# Patient Record
Sex: FEMALE | Race: WHITE | URBAN - METROPOLITAN AREA
[De-identification: names, ages, dates, MRNs, and addresses within clinical notes are randomized per-mention and may not be internally consistent; named-entity substitution may affect disease eponyms.]

---

## 2017-03-23 ENCOUNTER — PRE VISIT (OUTPATIENT)
Dept: OTOLARYNGOLOGY | Facility: CLINIC | Age: 62
End: 2017-03-23

## 2017-03-23 NOTE — TELEPHONE ENCOUNTER
1.  Date/reason for appt:4/4/17, Subglottic stenosis  2.  Referring provider: REED ARTHUR  3.  Call to patient (Yes / No - short description):No, referred  4.  Previous care at / records requested from:   [facility]- faxed cover sheet requesting records.

## 2017-03-23 NOTE — TELEPHONE ENCOUNTER
Radiology reports received from [facility]. Notified the film room to pull images.  CT head on 4/19/16  CT angio head on 4/19/16

## 2017-03-23 NOTE — TELEPHONE ENCOUNTER
Records received from [facility].   Included  Office notes: 10/19/16   SLP notes on 5/9/16, 5/10/16  Op/Procedure notes: bronchoscopy on 2/6/17  Other: PFT on 2/2/17

## 2017-04-04 ENCOUNTER — OFFICE VISIT (OUTPATIENT)
Dept: OTOLARYNGOLOGY | Facility: CLINIC | Age: 62
End: 2017-04-04

## 2017-04-04 VITALS — BODY MASS INDEX: 41.11 KG/M2 | WEIGHT: 232 LBS | HEIGHT: 63 IN

## 2017-04-04 DIAGNOSIS — R49.0 DYSPHONIA: Primary | ICD-10-CM

## 2017-04-04 DIAGNOSIS — J39.8 TRACHEAL STENOSIS: ICD-10-CM

## 2017-04-04 RX ORDER — MECLIZINE HYDROCHLORIDE 25 MG/1
25 TABLET ORAL
COMMUNITY
Start: 2017-03-09

## 2017-04-04 RX ORDER — ONDANSETRON 4 MG/1
4 TABLET, ORALLY DISINTEGRATING ORAL
COMMUNITY
Start: 2017-03-09

## 2017-04-04 RX ORDER — SUMATRIPTAN 50 MG/1
TABLET, FILM COATED ORAL
COMMUNITY
Start: 2016-08-03

## 2017-04-04 RX ORDER — HYDROMORPHONE HYDROCHLORIDE 2 MG/1
2-4 TABLET ORAL
COMMUNITY
Start: 2016-11-30

## 2017-04-04 RX ORDER — AMLODIPINE BESYLATE 5 MG/1
TABLET ORAL
COMMUNITY
Start: 2016-10-31

## 2017-04-04 RX ORDER — OXYCODONE HYDROCHLORIDE 5 MG/1
TABLET ORAL
COMMUNITY
Start: 2017-03-08

## 2017-04-04 RX ORDER — CITALOPRAM HYDROBROMIDE 20 MG/1
TABLET ORAL
COMMUNITY
Start: 2016-12-14

## 2017-04-04 RX ORDER — METOPROLOL TARTRATE 50 MG
50 TABLET ORAL
COMMUNITY
Start: 2016-04-21

## 2017-04-04 RX ORDER — POLYETHYLENE GLYCOL 3350 17 G/17G
17 POWDER, FOR SOLUTION ORAL
COMMUNITY
Start: 2016-08-03

## 2017-04-04 RX ORDER — SENNOSIDES A AND B 8.6 MG/1
TABLET, FILM COATED ORAL
COMMUNITY
Start: 2016-10-26

## 2017-04-04 RX ORDER — SUMATRIPTAN 100 MG/1
100 TABLET, FILM COATED ORAL
COMMUNITY
Start: 2017-03-09

## 2017-04-04 RX ORDER — LOSARTAN POTASSIUM 100 MG/1
100 TABLET ORAL
COMMUNITY
Start: 2017-01-24

## 2017-04-04 RX ORDER — IBUPROFEN 800 MG/1
TABLET, FILM COATED ORAL
COMMUNITY
Start: 2016-12-12

## 2017-04-04 RX ORDER — METHOCARBAMOL 750 MG/1
TABLET, FILM COATED ORAL
COMMUNITY
Start: 2017-01-11

## 2017-04-04 RX ORDER — GABAPENTIN 300 MG/1
CAPSULE ORAL
COMMUNITY
Start: 2017-01-12

## 2017-04-04 ASSESSMENT — PAIN SCALES - GENERAL: PAINLEVEL: NO PAIN (0)

## 2017-04-04 NOTE — MR AVS SNAPSHOT
After Visit Summary   2017    Jacqui Rodriguez    MRN: [id number]           Patient Information     Date Of Birth          1955        Visit Information        Provider Department      2017 9:00 AM Lit Bridges MD [facility]        Today's Diagnoses     Dysphonia    -  1    Tracheal stenosis          Care Instructions    Plan of care:  Follow up with Dr Bridges as needed  Clinic contact information:  1. To schedule an appointment call [phone], option 1  2. To talk to the Triage RN call [phone], option 3  3. If you need to speak to Whit or get a message to your doctor on a Friday, call the triage RN  4. WhitRN: [phone]  5. Surgery scheduling:      Jennifer Oswald: [phone]      Cinthya Irizaryr: [phone]  6. Fax: [phone]  7. [phone]          Follow-ups after your visit        Follow-up notes from your care team     Return if symptoms worsen or fail to improve.      Who to contact     Please call your clinic at [phone] to:    Ask questions about your health    Make or cancel appointments    Discuss your medicines    Learn about your test results    Speak to your doctor   If you have compliments or concerns about an experience at your clinic, or if you wish to file a complaint, please contact [facility] Patient Relations at [phone] or email us at [email or web address]         Additional Information About Your Visit        MyChart Information     Xambala is an electronic gateway that provides easy, online access to your medical records. With Xambala, you can request a clinic appointment, read your test results, renew a prescription or communicate with your care team.     To sign up for Xambala visit the website at [email or web address]   You will be asked to enter the access code listed below, as well as some personal information. Please follow the directions to create your  "username and password.     Your access code is: [email or web address]  Expires: 2017  6:30 AM     Your access code will  in 90 days. If you need help or a new code, please contact your [facility] or call [phone] for assistance.        Care EveryWhere ID     This is your Care EveryWhere ID. This could be used by other organizations to access your [facility] medical records  [id number]        Your Vitals Were     Height BMI (Body Mass Index)                1.6 m (5' 3\") 41.1 kg/m2           Blood Pressure from Last 3 Encounters:   No data found for BP    Weight from Last 3 Encounters:   17 105.2 kg (232 lb)              We Performed the Following     IMAGESTREAM RECORDING ORDER     LARYNGOSCOPY FLEX FIBEROPTIC, DIAGNOSTIC        Primary Care Provider    None Specified       No primary provider on file.        Thank you!     Thank you for choosing [facility] EAR [facility]SE AND THROAT  for your care. Our goal is always to provide you with excellent care. Hearing back from our patients is one way we can continue to improve our services. Please take a few minutes to complete the written survey that you may receive in the mail after your visit with us. Thank you!             Your Updated Medication List - Protect others around you: Learn how to safely use, store and throw away your medicines at www.disposemymeds.org.          This list is accurate as of: 17 11:59 PM.  Always use your most recent med list.                   Brand Name Dispense Instructions for use    amLODIPine 5 MG tablet    NORVASC     TAKE 1 TABLET BY MOUTH DAILY       aspirin 81 MG EC tablet      Take 81 mg by mouth       calcium carbonate 1250 (500 CA) MG/5ML Susp suspension      1,250 mg by Nasogastric route       cholecalciferol 1000 UNIT tablet    vitamin D     Take 1,000 Units by mouth       citalopram 20 MG tablet    celeXA     TAKE 1 TABLET BY MOUTH EVERY DAY       diclofenac 1 % Gel topical gel    VOLTAREN "     APPLY 2 GRAMS EXTERNALLY TO THE AFFECTED AREA FOUR TIMES DAILY       gabapentin 300 MG capsule    NEURONTIN         HYDROmorphone 2 MG tablet    DILAUDID     Take 2-4 mg by mouth       ibuprofen 800 MG tablet    ADVIL/MOTRIN     TAKE 1 TABLET BY MOUTH THREE TIMES DAILY       losartan 100 MG tablet    COZAAR     Take 100 mg by mouth       meclizine 25 MG tablet    ANTIVERT     Take 25 mg by mouth       methocarbamol 750 MG tablet    ROBAXIN     TAKE 2 TABLETS BY MOUTH THREE TIMES DAILY       metoprolol 50 MG tablet    LOPRESSOR     Take 50 mg by mouth       ondansetron 4 MG ODT tab    ZOFRAN-ODT     Take 4 mg by mouth       oxyCODONE 5 MG IR tablet    ROXICODONE         polyethylene glycol powder    MIRALAX/GLYCOLAX     Take 17 g by mouth       senna 8.6 MG tablet    SENOKOT         * SUMAtriptan 50 MG tablet    IMITREX     Reported on 3/9/2017       * SUMAtriptan 100 MG tablet    IMITREX     Take 100 mg by mouth       ZANTAC 150 MG tablet   Generic drug:  ranitidine          * Notice:  This list has 2 medication(s) that are the same as other medications prescribed for you. Read the directions carefully, and ask your doctor or other care provider to review them with you.

## 2017-04-04 NOTE — NURSING NOTE
Chief Complaint   Patient presents with     Consult     throat     Veronika Castillo Medical Assistant

## 2017-04-04 NOTE — LETTER
4/4/2017       RE: Jacqui Rodriguez  [address] [address]     Dear Colleague,    Thank you for referring your patient, Jacqui Rodriguez, to the [facility] E[facility] NO[facility] THROAT at [facility]. Please see a copy of my visit note below.      HISTORY OF PRESENT ILLNESS:  Ms. Rodriguez is a 61-year-old lady who is seen at the request of Dr. Divya Presley.  She has a complex medical history involving a cardiac arrest in 2005 with prolonged mechanical ventilation and subsequent tracheotomy.  She was eventually decannulated.  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  She had a prolonged recovery and did suffer anoxic encephalopathy.  She recently had an anterior cervical neck surgery for chronic neck pain.  This course again was complicated requiring tracheotomy.  She had apparently an anterior cervical fusion of C3-C7, developed stridor post intubation and required an emergency tracheotomy on March 24, 2016.  She was eventually decannulated in August of 2016.  She was seen by an otolaryngologist and the vocal folds appeared intact; however, there is some slight weakness of the right vocal fold.  She is very inactive because of knee and chronic back pain.  She lives with her sister.  She does have sleep apnea and uses her CPAP every night and occasionally will use it during the day.  She did have a bronchoscopy by Dr. Presley on February 6, 2017.  This found a subglottic stenosis.  Approximately 4 cm below the cricoid or eulalio, there was narrowing of the trachea.  This was a dynamic collapse.  It actually improved on inhalation and collapsed on forced expiration.  The lower parts of her trachea appeared quite healthy.  The patient notes that she has no difficulty breathing in, but often will make noise when she is breathing out.  She is here for evaluation and recommendations.  She did bring along photographs of the exam which showed  a tracheal stenosis consistent with a prior tracheotomy and anterior cartilage defect.        Last 2 Scores for Patient-Answered VHI Questionnaire  VHI Total Score 4/4/2017   VHI Total Score 0       Last 2 Scores for Patient-Answered CSI Questionnaire  CSI Total Score 4/4/2017   CSI Total Score Incomplete         Last 2 Scores for Patient-Answered EAT Questionnaire  No flowsheet data found.    Active Problems  Reconcile with Patient's Chart    Problem Noted Date   Advanced care planning/counseling discussion 12/28/2016   Acute encephalopathy 04/22/2016   Overview:     multifactorial        Acute focal neurological deficit 04/19/2016   Overview:     acute neurologic change 4/19/16 - not moving left side, gaze preference to   right noted. possible seizure vs. stroke event.        Delirium due to multiple etiologies, acute, mixed level of activity 04/14/2016   Mild neurocognitive disorder due to another medical condition 04/14/2016   Acute respiratory failure (HRC) 03/28/2016   Respiratory distress 03/23/2016   Oropharyngeal bleeding 03/23/2016   Cervical radiculopathy 03/20/2016   Overview:     S/p cervical fusion 3/18/16       Dysphagia 03/20/2016   S/P arthroscopy of shoulder 11/11/2015   Aftercare following surgery of the musculoskeletal system 11/11/2015   S/P rotator cuff repair 11/11/2015   Chronic pain syndrome 02/02/2015   Lumbosacral spondylosis without myelopathy 08/29/2014   Displacement of lumbar intervertebral disc without myelopathy 08/14/2014   Sciatica 08/01/2014   Allergic rhinitis 05/20/2013   Esophageal reflux 05/12/2011   Migraine without aura 05/12/2011   Lumbago 05/12/2011   Pain in or around eye 02/09/2011   Temporomandibular joint disorder (TMJ) 02/09/2011   Obesity 09/04/2009   Debility 01/28/2008   Obstructive sleep apnea 03/20/2006   Overview:     Setting: APAP 8-15cm H2O  Supplied by: [facility] 4/17/2014  PSG done: 3/17/2006 and 4/16/2014  AHI 23  RDI 43  Lowest O2 Sat: 86%  5/19/14 Renew, pt is  compliant  renew 11/03/2016     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  LW Onset: 70ORB0384       Mood disorder due to known physiological condition 10/24/2005   Impulse control disorder 09/28/2005   Overview:     LW Onset: 18Kuk26       Disease of trachea and bronchus 07/01/2005   Overview:     LW Onset: 34Lmr96       Anoxic brain damage ([facility]) 07/01/2005   Overview:     LW Onset: April 5 , 2005 with VShelley fib cardiac arrest.       Ventricular fibrillation ([facility]) 04/05/2005   Rotator cuff (capsule) sprain 07/20/2004   Overview:     LW Modifier: Bilat repairs L 2000, R 1994  LW Onset: 2000       Osteoarthritis 06/07/2003   Essential hypertension      Resolved Problems    Problem Noted Date Resolved Date   Acute myocardial infarction, subsequent episode of care ([facility]) 09/28/2005 03/28/2006   Overview:     LW Onset: 18Ecc44       Hypopotassemia 08/23/2005 09/23/2005   Overview:     LW Onset: 45Oln27       Acute myocardial infarction, initial episode of care ([facility]) 08/23/2005 02/20/2006   Overview:     LW Onset: 87Yyf14       Loss of weight 08/05/2005 12/19/2006   Overview:     LW Modifier: needs GT  LW Onset: 92Sau68       Cardiac arrest ([facility]) 07/01/2005 08/31/2005   Overview:     LW Modifier: S/P with implantable defibrillator  LW Onset: 01Jul05       Surgical History    Surgery Date Comments   Cardiac Defibrillator Placement   LW Problem: Implantable Cardioversion Device S/p LW Modifier: Removed Nov.2008 LW Onset: 36Lqy50   Total Abdominal Hysterectomy   LW Problem: Hysterectomy Abdominal s/p LW Modifier: With BSO   Tracheostomy Tube Placement       Medical History    Medical History Date Comments   Hypertension       GERD (gastroesophageal reflux disease)       Chronic low back pain       Depression       Migraines           PAST MEDICAL HISTORY: No past medical history on file.    PAST SURGICAL HISTORY: No past surgical history on  file.    FAMILY HISTORY: No family history on file.    SOCIAL HISTORY:   Social History   Substance Use Topics     Smoking status: Not on file     Smokeless tobacco: Not on file     Alcohol use Not on file       REVIEW OF SYSTEMS: Ten point review of systems was performed and is negative except for: No flowsheet data found.     ALLERGIES: Prochlorperazine; Latex; and Wound dressing adhesive    MEDICATIONS:   Current Outpatient Prescriptions   Medication Sig Dispense Refill     amLODIPine (NORVASC) 5 MG tablet TAKE 1 TABLET BY MOUTH DAILY       calcium carbonate 1250 (500 CA) MG/5ML SUSP suspension 1,250 mg by Nasogastric route       cholecalciferol (VITAMIN D3) 1000 UNIT tablet Take 1,000 Units by mouth       citalopram (CELEXA) 20 MG tablet TAKE 1 TABLET BY MOUTH EVERY DAY       diclofenac (VOLTAREN) 1 % GEL topical gel APPLY 2 GRAMS EXTERNALLY TO THE AFFECTED AREA FOUR TIMES DAILY       gabapentin (NEURONTIN) 300 MG capsule        HYDROmorphone (DILAUDID) 2 MG tablet Take 2-4 mg by mouth       ibuprofen (ADVIL/MOTRIN) 800 MG tablet TAKE 1 TABLET BY MOUTH THREE TIMES DAILY       losartan (COZAAR) 100 MG tablet Take 100 mg by mouth       meclizine (ANTIVERT) 25 MG tablet Take 25 mg by mouth       methocarbamol (ROBAXIN) 750 MG tablet TAKE 2 TABLETS BY MOUTH THREE TIMES DAILY       metoprolol (LOPRESSOR) 50 MG tablet Take 50 mg by mouth       ranitidine (ZANTAC) 150 MG tablet        senna (SENOKOT) 8.6 MG tablet        SUMAtriptan (IMITREX) 100 MG tablet Take 100 mg by mouth       SUMAtriptan (IMITREX) 50 MG tablet Reported on 3/9/2017       ondansetron (ZOFRAN-ODT) 4 MG ODT tab Take 4 mg by mouth       oxyCODONE (ROXICODONE) 5 MG IR tablet        polyethylene glycol (MIRALAX/GLYCOLAX) powder Take 17 g by mouth       aspirin 81 MG EC tablet Take 81 mg by mouth             PHYSICAL EXAMINATION:  On examination today, she is awake, alert, in no apparent distress.  She is breathing comfortably.  With deep inspiration,  there is minimal to no airway turbulaence.  With forced expiration, there is wheezing at end expiration.  I did have her work with active inspiration and passive expiration and this did resolve the air flow problem and she did note breathing much more comfortably on exhalation.  Her tympanic membranes are clear and mobile bilaterally.  Nasal exam shows a mild septal deviation, without obstruction.  Examination of the oral cavity shows her to be a Mallampati 4.  Posterior oropharynx was difficult to see.  Neck is supple without significant adenopathy.  There is a tracheotomy scar low in the neck which is well-healed.  Pulse is regular.  Upper airway is as noted above.  Cranial nerves II-XII are grossly intact.      PROCEDURE:  Fiberoptic laryngoscopy was performed following topical anesthesia with 3% lidocaine and 0.25% phenylephrine.  Consent was obtained, timeout was performed.  Scope was passed through the right nostril.  This showed the vocal folds to be mobile and meet in the midline.  No nodules, polyps or ulcerations are seen.  The immediate subglottic area is quite clear.  Further down the trachea, there is some mild narrowing extending down to the V-shaped stenosis consistent with an anterior cartilage defect.  There was some passive mobility seen with the scope and no upper airway stridor or no tracheal airway turbulence was seen short of her forcing exhalation in which there wassome closure.  No granulation tissue is seen and this is consistent with the photographs from Dr. Presley in February.      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  This did also seem to respond to some initial breathing exercises.  I discussed with her the various options for treatment, which would include a tracheal resection since dilation and endoscopic scar division would not be effective in her case.  This could be performed by one of our head and neck surgeons or thoracic  surgeons.  Another option would be weight loss and speech therapy.  As she is comfortable at rest and light exercises and her exercise is limited due to knee and back pain, she is quite interested in pursuing the weight loss and speech therapy option.  She would prefer to pursue therapy through [facility] as the rest of her care is through that area.  We also talked the possibility of weight loss surgery, but she again would try to see if nonsurgical options are available.  All questions were answered.  I will have her follow up with Dr. Presley and Dr. Concepcion her pulmonologist and primary care physician to assist with these options.         Again, thank you for allowing me to participate in the care of your patient.      Sincerely,    Lit Bridges MD     cc: Osmel Concepcion MD           [address] [place] [address]                     Divya Presley MD           [address] [place] [id number]                     CHRIS/gordon

## 2017-04-04 NOTE — PROGRESS NOTES
HISTORY OF PRESENT ILLNESS:  Ms. Rodriguez is a 61-year-old lady who is seen at the request of Dr. Divya Presley.  She has a complex medical history involving a cardiac arrest in 2005 with prolonged mechanical ventilation and subsequent tracheotomy.  She was eventually decannulated.  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  She had a prolonged recovery and did suffer anoxic encephalopathy.  She recently had an anterior cervical neck surgery for chronic neck pain.  This course again was complicated requiring tracheotomy.  She had apparently an anterior cervical fusion of C3-C7, developed stridor post intubation and required an emergency tracheotomy on March 24, 2016.  She was eventually decannulated in August of 2016.  She was seen by an otolaryngologist and the vocal folds appeared intact; however, there is some slight weakness of the right vocal fold.  She is very inactive because of knee and chronic back pain.  She lives with her sister.  She does have sleep apnea and uses her CPAP every night and occasionally will use it during the day.  She did have a bronchoscopy by Dr. Presley on February 6, 2017.  This found a subglottic stenosis.  Approximately 4 cm below the cricoid or eulalio, there was narrowing of the trachea.  This was a dynamic collapse.  It actually improved on inhalation and collapsed on forced expiration.  The lower parts of her trachea appeared quite healthy.  The patient notes that she has no difficulty breathing in, but often will make noise when she is breathing out.  She is here for evaluation and recommendations.  She did bring along photographs of the exam which showed a tracheal stenosis consistent with a prior tracheotomy and anterior cartilage defect.        Last 2 Scores for Patient-Answered VHI Questionnaire  VHI Total Score 4/4/2017   VHI Total Score 0       Last 2 Scores for Patient-Answered CSI Questionnaire  CSI Total Score 4/4/2017   CSI Total Score  Incomplete         Last 2 Scores for Patient-Answered EAT Questionnaire  No flowsheet data found.    Active Problems  Reconcile with Patient's Chart    Problem Noted Date   Advanced care planning/counseling discussion 12/28/2016   Acute encephalopathy 04/22/2016   Overview:     multifactorial        Acute focal neurological deficit 04/19/2016   Overview:     acute neurologic change 4/19/16 - not moving left side, gaze preference to   right noted. possible seizure vs. stroke event.        Delirium due to multiple etiologies, acute, mixed level of activity 04/14/2016   Mild neurocognitive disorder due to another medical condition 04/14/2016   Acute respiratory failure (HRC) 03/28/2016   Respiratory distress 03/23/2016   Oropharyngeal bleeding 03/23/2016   Cervical radiculopathy 03/20/2016   Overview:     S/p cervical fusion 3/18/16       Dysphagia 03/20/2016   S/P arthroscopy of shoulder 11/11/2015   Aftercare following surgery of the musculoskeletal system 11/11/2015   S/P rotator cuff repair 11/11/2015   Chronic pain syndrome 02/02/2015   Lumbosacral spondylosis without myelopathy 08/29/2014   Displacement of lumbar intervertebral disc without myelopathy 08/14/2014   Sciatica 08/01/2014   Allergic rhinitis 05/20/2013   Esophageal reflux 05/12/2011   Migraine without aura 05/12/2011   Lumbago 05/12/2011   Pain in or around eye 02/09/2011   Temporomandibular joint disorder (TMJ) 02/09/2011   Obesity 09/04/2009   Debility 01/28/2008   Obstructive sleep apnea 03/20/2006   Overview:     Setting: APAP 8-15cm H2O  Supplied by: [facility] 4/17/2014  PSG done: 3/17/2006 and 4/16/2014  AHI 23  RDI 43  Lowest O2 Sat: 86%  5/19/14 Renew, pt is compliant  renew 11/03/2016     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  LW Onset: [id number]       Mood disorder due to known physiological condition 10/24/2005   Impulse control disorder 09/28/2005    Overview:     LW Onset: 96Omu57       Disease of trachea and bronchus 07/01/2005   Overview:     LW Onset: 82Crx24       Anoxic brain damage ([facility]) 07/01/2005   Overview:     LW Onset: April 5 , 2005 with VShelley fib cardiac arrest.       Ventricular fibrillation ([facility]) 04/05/2005   Rotator cuff (capsule) sprain 07/20/2004   Overview:     LW Modifier: Bilat repairs L 2000, R 1994  LW Onset: 2000       Osteoarthritis 06/07/2003   Essential hypertension      Resolved Problems    Problem Noted Date Resolved Date   Acute myocardial infarction, subsequent episode of care ([facility]) 09/28/2005 03/28/2006   Overview:     LW Onset: 80Cru48       Hypopotassemia 08/23/2005 09/23/2005   Overview:     LW Onset: 74Ohu92       Acute myocardial infarction, initial episode of care ([facility]) 08/23/2005 02/20/2006   Overview:     LW Onset: 35Bok12       Loss of weight 08/05/2005 12/19/2006   Overview:     LW Modifier: needs GT  LW Onset: 61Oqu04       Cardiac arrest ([facility]) 07/01/2005 08/31/2005   Overview:     LW Modifier: S/P with implantable defibrillator  LW Onset: 01Jul05       Surgical History    Surgery Date Comments   Cardiac Defibrillator Placement   LW Problem: Implantable Cardioversion Device S/p LW Modifier: Removed Nov.2008 LW Onset: 07Yit56   Total Abdominal Hysterectomy   LW Problem: Hysterectomy Abdominal s/p LW Modifier: With BSO   Tracheostomy Tube Placement       Medical History    Medical History Date Comments   Hypertension       GERD (gastroesophageal reflux disease)       Chronic low back pain       Depression       Migraines           PAST MEDICAL HISTORY: No past medical history on file.    PAST SURGICAL HISTORY: No past surgical history on file.    FAMILY HISTORY: No family history on file.    SOCIAL HISTORY:   Social History   Substance Use Topics     Smoking status: Not on file     Smokeless tobacco: Not on file     Alcohol use Not on file       REVIEW OF SYSTEMS: Ten point review of systems was performed and is negative  except for: No flowsheet data found.     ALLERGIES: Prochlorperazine; Latex; and Wound dressing adhesive    MEDICATIONS:   Current Outpatient Prescriptions   Medication Sig Dispense Refill     amLODIPine (NORVASC) 5 MG tablet TAKE 1 TABLET BY MOUTH DAILY       calcium carbonate 1250 (500 CA) MG/5ML SUSP suspension 1,250 mg by Nasogastric route       cholecalciferol (VITAMIN D3) 1000 UNIT tablet Take 1,000 Units by mouth       citalopram (CELEXA) 20 MG tablet TAKE 1 TABLET BY MOUTH EVERY DAY       diclofenac (VOLTAREN) 1 % GEL topical gel APPLY 2 GRAMS EXTERNALLY TO THE AFFECTED AREA FOUR TIMES DAILY       gabapentin (NEURONTIN) 300 MG capsule        HYDROmorphone (DILAUDID) 2 MG tablet Take 2-4 mg by mouth       ibuprofen (ADVIL/MOTRIN) 800 MG tablet TAKE 1 TABLET BY MOUTH THREE TIMES DAILY       losartan (COZAAR) 100 MG tablet Take 100 mg by mouth       meclizine (ANTIVERT) 25 MG tablet Take 25 mg by mouth       methocarbamol (ROBAXIN) 750 MG tablet TAKE 2 TABLETS BY MOUTH THREE TIMES DAILY       metoprolol (LOPRESSOR) 50 MG tablet Take 50 mg by mouth       ranitidine (ZANTAC) 150 MG tablet        senna (SENOKOT) 8.6 MG tablet        SUMAtriptan (IMITREX) 100 MG tablet Take 100 mg by mouth       SUMAtriptan (IMITREX) 50 MG tablet Reported on 3/9/2017       ondansetron (ZOFRAN-ODT) 4 MG ODT tab Take 4 mg by mouth       oxyCODONE (ROXICODONE) 5 MG IR tablet        polyethylene glycol (MIRALAX/GLYCOLAX) powder Take 17 g by mouth       aspirin 81 MG EC tablet Take 81 mg by mouth             PHYSICAL EXAMINATION:  On examination today, she is awake, alert, in no apparent distress.  She is breathing comfortably.  With deep inspiration, there is minimal to no airway turbulaence.  With forced expiration, there is wheezing at end expiration.  I did have her work with active inspiration and passive expiration and this did resolve the air flow problem and she did note breathing much more comfortably on exhalation.  Her  tympanic membranes are clear and mobile bilaterally.  Nasal exam shows a mild septal deviation, without obstruction.  Examination of the oral cavity shows her to be a Mallampati 4.  Posterior oropharynx was difficult to see.  Neck is supple without significant adenopathy.  There is a tracheotomy scar low in the neck which is well-healed.  Pulse is regular.  Upper airway is as noted above.  Cranial nerves II-XII are grossly intact.      PROCEDURE:  Fiberoptic laryngoscopy was performed following topical anesthesia with 3% lidocaine and 0.25% phenylephrine.  Consent was obtained, timeout was performed.  Scope was passed through the right nostril.  This showed the vocal folds to be mobile and meet in the midline.  No nodules, polyps or ulcerations are seen.  The immediate subglottic area is quite clear.  Further down the trachea, there is some mild narrowing extending down to the V-shaped stenosis consistent with an anterior cartilage defect.  There was some passive mobility seen with the scope and no upper airway stridor or no tracheal airway turbulence was seen short of her forcing exhalation in which there wassome closure.  No granulation tissue is seen and this is consistent with the photographs from Dr. Presley in February.      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  This did also seem to respond to some initial breathing exercises.  I discussed with her the various options for treatment, which would include a tracheal resection since dilation and endoscopic scar division would not be effective in her case.  This could be performed by one of our head and neck surgeons or thoracic surgeons.  Another option would be weight loss and speech therapy.  As she is comfortable at rest and light exercises and her exercise is limited due to knee and back pain, she is quite interested in pursuing the weight loss and speech therapy option.  She would prefer to pursue  therapy through the [facility] system as the rest of her care is through that area.  We also talked the possibility of weight loss surgery, but she again would try to see if nonsurgical options are available.  All questions were answered.  I will have her follow up with Dr. Presley and Dr. Concepcion her pulmonologist and primary care physician to assist with these options.        cc: Osmel Concepcion MD           [address] [place] [address]                     Divya Presley MD           [facility] [address] [id number]                     GG/lz

## 2017-04-04 NOTE — PATIENT INSTRUCTIONS
Plan of care:  Follow up with Dr Bridges as needed  Clinic contact information:  1. To schedule an appointment call [phone], option 1  2. To talk to the Triage RN call [phone], option 3  3. If you need to speak to Whit or get a message to your doctor on a Friday, call the triage RN  4. WhitRN: [phone]  5. Surgery scheduling:      Jennifer Oswald: [phone]      Cinthya Irizarry: [phone]  6. Fax: [phone]  7. [phone]

## 2021-05-29 ENCOUNTER — RECORDS - HEALTHEAST (OUTPATIENT)
Dept: ADMINISTRATIVE | Facility: CLINIC | Age: 66
End: 2021-05-29